# Patient Record
(demographics unavailable — no encounter records)

---

## 2025-01-22 NOTE — HEALTH RISK ASSESSMENT
[Yes] : Yes [2 - 4 times a month (2 pts)] : 2-4 times a month (2 points) [1 or 2 (0 pts)] : 1 or 2 (0 points) [Never (0 pts)] : Never (0 points) [No] : In the past 12 months have you used drugs other than those required for medical reasons? No [0] : 2) Feeling down, depressed, or hopeless: Not at all (0) [PHQ-2 Negative - No further assessment needed] : PHQ-2 Negative - No further assessment needed [Current] : Current [Patient declined colonoscopy] : Patient declined colonoscopy [Audit-CScore] : 2 [[id number]] : 0 [ColonoscopyComments] : Cologuard negative in 8/24

## 2025-01-22 NOTE — REVIEW OF SYSTEMS
[Fever] : no fever [Recent Change In Weight] : ~T no recent weight change [Chest Pain] : no chest pain [Shortness Of Breath] : no shortness of breath [Cough] : no cough [Dyspnea on Exertion] : not dyspnea on exertion [Abdominal Pain] : no abdominal pain [Fainting] : no fainting

## 2025-01-22 NOTE — PHYSICAL EXAM
[No Acute Distress] : no acute distress [No JVD] : no jugular venous distention [No Lymphadenopathy] : no lymphadenopathy [Clear to Auscultation] : lungs were clear to auscultation bilaterally [Regular Rhythm] : with a regular rhythm [Normal S1, S2] : normal S1 and S2 [No Murmur] : no murmur heard [No Carotid Bruits] : no carotid bruits [No Edema] : there was no peripheral edema [No Focal Deficits] : no focal deficits [Alert and Oriented x3] : oriented to person, place, and time

## 2025-01-22 NOTE — ASSESSMENT
[FreeTextEntry1] : His exam is unremarkable/unchanged. Fasting labs including a PSA were sent.  Vrxwbxbuweqyom-svjoif-cm lipid profile sent as well. Presently on Crestor 10 mg daily. We did discuss that his cholesterol does remain elevated we will consider increasing the dose.  Current smoker-he is going to try Chantix starter pack. Potential side effects discussed.  Did Cologuard in 8/24 that was negative. Knows that he needs to be repeated in 3 years.

## 2025-01-22 NOTE — HISTORY OF PRESENT ILLNESS
[de-identified] : 58-year-old male presents for annual wellness visit, follow-up fasting labs and prescription renewals. He has a history of hyperlipidemia.  Also has been smoking again. Has quit in the past and would be interested in trying to quit again. Has been generally well without recent illness. Did Cologuard in 8/24 that was negative.

## 2025-07-23 NOTE — PHYSICAL EXAM
[No Acute Distress] : no acute distress [No JVD] : no jugular venous distention [Clear to Auscultation] : lungs were clear to auscultation bilaterally [Regular Rhythm] : with a regular rhythm [Normal S1, S2] : normal S1 and S2 [No Murmur] : no murmur heard [No Edema] : there was no peripheral edema [de-identified] : Benign

## 2025-07-23 NOTE — ASSESSMENT
[FreeTextEntry1] : His exam is unremarkable/unchanged.  Ionhmwqwlosadb-fczucf-zy fasting labs/liver profile sent. Presently on Crestor 10 mg daily. We did discuss that his cholesterol does remain elevated we will consider increasing the dose.  Current smoker-he is going to try going back to Chantix again.  He did find it very helpful in the past.

## 2025-07-23 NOTE — HISTORY OF PRESENT ILLNESS
[de-identified] : Presents for follow-up fasting labs and prescription renewal. He has history of hyperlipidemia. Also states that he started smoking again a few weeks ago.  Had quit using Chantix and found it very helpful.  Had quit for approximately 5 months.

## 2025-07-23 NOTE — REVIEW OF SYSTEMS
[Fever] : no fever [Recent Change In Weight] : ~T no recent weight change [Chest Pain] : no chest pain [Palpitations] : no palpitations [Shortness Of Breath] : no shortness of breath [Cough] : no cough [Abdominal Pain] : no abdominal pain